# Patient Record
Sex: FEMALE | Race: BLACK OR AFRICAN AMERICAN | NOT HISPANIC OR LATINO | URBAN - METROPOLITAN AREA
[De-identification: names, ages, dates, MRNs, and addresses within clinical notes are randomized per-mention and may not be internally consistent; named-entity substitution may affect disease eponyms.]

---

## 2023-01-16 ENCOUNTER — EMERGENCY (EMERGENCY)
Facility: HOSPITAL | Age: 43
LOS: 1 days | Discharge: HOME | End: 2023-01-16
Attending: STUDENT IN AN ORGANIZED HEALTH CARE EDUCATION/TRAINING PROGRAM | Admitting: STUDENT IN AN ORGANIZED HEALTH CARE EDUCATION/TRAINING PROGRAM
Payer: COMMERCIAL

## 2023-01-16 VITALS
DIASTOLIC BLOOD PRESSURE: 85 MMHG | TEMPERATURE: 98 F | RESPIRATION RATE: 16 BRPM | OXYGEN SATURATION: 98 % | SYSTOLIC BLOOD PRESSURE: 137 MMHG | HEART RATE: 84 BPM

## 2023-01-16 VITALS
OXYGEN SATURATION: 98 % | DIASTOLIC BLOOD PRESSURE: 87 MMHG | TEMPERATURE: 98 F | WEIGHT: 199.96 LBS | HEART RATE: 88 BPM | RESPIRATION RATE: 16 BRPM | SYSTOLIC BLOOD PRESSURE: 141 MMHG

## 2023-01-16 PROCEDURE — 99283 EMERGENCY DEPT VISIT LOW MDM: CPT

## 2023-01-16 RX ORDER — IBUPROFEN 200 MG
600 TABLET ORAL ONCE
Refills: 0 | Status: COMPLETED | OUTPATIENT
Start: 2023-01-16 | End: 2023-01-16

## 2023-01-16 RX ADMIN — Medication 600 MILLIGRAM(S): at 13:52

## 2023-01-16 RX ADMIN — Medication 600 MILLIGRAM(S): at 13:31
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
## 2023-01-16 NOTE — ED PROVIDER NOTE - NS ED ATTENDING STATEMENT MOD
This was a shared visit with the TORIE. I reviewed and verified the documentation and independently performed the documented:

## 2023-01-16 NOTE — ED PROVIDER NOTE - CPE EDP NEURO NORM
This is a recent snapshot of the patient's [facility] medical record.  For current drug dose and complete information and questions, call [phone] or In Basket pool, [facility] home infusion ([id number])  CSN Number:  [id number]       normal...

## 2023-01-16 NOTE — ED PROVIDER NOTE - SKIN, MLM
Skin intact. No evidence of rash. + mild swelling to right anterior lateral neck. no abrasions or lacerations. no ecchymosis. cap refill < 2 sec

## 2023-01-16 NOTE — ED ADULT NURSE NOTE - FINAL NURSING ELECTRONIC SIGNATURE
Blood pressure 104/72, pulse 72, height 3' 10.5\" (1.181 m), weight 52 lb 2 oz (23.6 kg). Patient presents today with mother reporting he has had a rash for the past 4 months. Skin is peeling on his fingertips. He does not bite his nails per mother. 16-Jan-2023 13:54

## 2023-01-16 NOTE — ED PROVIDER NOTE - ENMT, MLM
Airway patent, Nasal mucosa clear. Mouth with normal mucosa. Throat has no vesicles, no oropharyngeal exudates and uvula is midline. + mild TTP right face/mandible. FROM of jaw.
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
## 2023-01-16 NOTE — ED PROVIDER NOTE - PATIENT PORTAL LINK FT
You can access the FollowMyHealth Patient Portal offered by [facility] by registering at the following website: [email or web address]. By joining [facility]’s FollowMyHealth portal, you will also be able to view your health information using other applications (apps) compatible with our system.

## 2023-01-16 NOTE — ED PROVIDER NOTE - MUSCULOSKELETAL, MLM
+ TTP right anterior lateral neck. no crepitus. UE strength 5/5 bilaterally. no midline cervical tenderness with FROM of neck. radial pulses equal and intact bilaterally.
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
## 2024-07-29 ENCOUNTER — OUTPATIENT (OUTPATIENT)
Dept: OUTPATIENT SERVICES | Facility: HOSPITAL | Age: 44
LOS: 1 days | End: 2024-07-29
Payer: COMMERCIAL

## 2024-07-29 VITALS
RESPIRATION RATE: 17 BRPM | DIASTOLIC BLOOD PRESSURE: 87 MMHG | OXYGEN SATURATION: 100 % | HEIGHT: 64 IN | WEIGHT: 216.05 LBS | SYSTOLIC BLOOD PRESSURE: 127 MMHG | HEART RATE: 86 BPM | TEMPERATURE: 97 F

## 2024-07-29 DIAGNOSIS — Z98.891 HISTORY OF UTERINE SCAR FROM PREVIOUS SURGERY: Chronic | ICD-10-CM

## 2024-07-29 DIAGNOSIS — N93.9 ABNORMAL UTERINE AND VAGINAL BLEEDING, UNSPECIFIED: ICD-10-CM

## 2024-07-29 DIAGNOSIS — N84.0 POLYP OF CORPUS UTERI: ICD-10-CM

## 2024-07-29 DIAGNOSIS — Z01.812 ENCOUNTER FOR PREPROCEDURAL LABORATORY EXAMINATION: ICD-10-CM

## 2024-07-29 DIAGNOSIS — Z01.818 ENCOUNTER FOR OTHER PREPROCEDURAL EXAMINATION: ICD-10-CM

## 2024-07-29 LAB
HCG SERPL-ACNC: <1 MIU/ML — SIGNIFICANT CHANGE UP
HCT VFR BLD CALC: 35.1 % — SIGNIFICANT CHANGE UP (ref 34.5–45)
HGB BLD-MCNC: 11.1 G/DL — LOW (ref 11.5–15.5)
MCHC RBC-ENTMCNC: 28.5 PG — SIGNIFICANT CHANGE UP (ref 27–34)
MCHC RBC-ENTMCNC: 31.6 GM/DL — LOW (ref 32–36)
MCV RBC AUTO: 90 FL — SIGNIFICANT CHANGE UP (ref 80–100)
NRBC # BLD: 0 /100 WBCS — SIGNIFICANT CHANGE UP (ref 0–0)
PLATELET # BLD AUTO: 244 K/UL — SIGNIFICANT CHANGE UP (ref 150–400)
RBC # BLD: 3.9 M/UL — SIGNIFICANT CHANGE UP (ref 3.8–5.2)
RBC # FLD: 12.9 % — SIGNIFICANT CHANGE UP (ref 10.3–14.5)
WBC # BLD: 4.68 K/UL — SIGNIFICANT CHANGE UP (ref 3.8–10.5)
WBC # FLD AUTO: 4.68 K/UL — SIGNIFICANT CHANGE UP (ref 3.8–10.5)

## 2024-07-29 PROCEDURE — G0463: CPT

## 2024-07-29 PROCEDURE — 86850 RBC ANTIBODY SCREEN: CPT

## 2024-07-29 PROCEDURE — 36415 COLL VENOUS BLD VENIPUNCTURE: CPT

## 2024-07-29 PROCEDURE — 86900 BLOOD TYPING SEROLOGIC ABO: CPT

## 2024-07-29 PROCEDURE — 84702 CHORIONIC GONADOTROPIN TEST: CPT

## 2024-07-29 PROCEDURE — 86901 BLOOD TYPING SEROLOGIC RH(D): CPT

## 2024-07-29 PROCEDURE — 85027 COMPLETE CBC AUTOMATED: CPT

## 2024-07-29 NOTE — H&P PST ADULT - NSICDXPROCEDURE_GEN_ALL_CORE_FT
PROCEDURES:  Hysteroscopic sampling of endometrium 29-Jul-2024 13:20:54 & Polypc w/wo D&C Jaye Pittman  Hysteroscopy with removal of leiomyomata 29-Jul-2024 13:21:15  Jaye Pittman

## 2024-07-29 NOTE — H&P PST ADULT - HISTORY OF PRESENT ILLNESS
44 y/o female with PMH of Iron Deficiency Anemia (on oral iron last 13 years) & SHx  x 2 for PST having Dilation and Curettage Hysteroscopy - Polypectomy with Myosure by Dr. Crystal on 2024.  She reports over the past year having pelvic discomfort during cycle and longer periods.  Was seen by provider, imaging revealed fibroid and possible uterine polyp was recommended for the procedure.

## 2024-07-29 NOTE — H&P PST ADULT - PROBLEM SELECTOR PLAN 3
Labs CBC, HCG and T&S  No Medical clearance necessary  Pre op instructions reviewed and given.   No meds to take am of surgery.   Instructed to hold and/or avoid other NSAIDs and OTC supplements. Tylenol is ok. Verbalized understanding

## 2024-07-29 NOTE — H&P PST ADULT - NSANTHOSAYNRD_GEN_A_CORE
No. EZEKIEL screening performed.  STOP BANG Legend: 0-2 = LOW Risk; 3-4 = INTERMEDIATE Risk; 5-8 = HIGH Risk

## 2024-07-29 NOTE — H&P PST ADULT - NSICDXFAMILYHX_GEN_ALL_CORE_FT
FAMILY HISTORY:  Mother  Still living? Unknown  FH: type 2 diabetes, Age at diagnosis: Age Unknown    Sibling  Still living? Unknown  FH: type 2 diabetes, Age at diagnosis: Age Unknown  FH: type 2 diabetes, Age at diagnosis: Age Unknown

## 2024-07-31 ENCOUNTER — OFFICE (OUTPATIENT)
Dept: URBAN - METROPOLITAN AREA CLINIC 6 | Facility: CLINIC | Age: 44
Setting detail: OPHTHALMOLOGY
End: 2024-07-31
Payer: COMMERCIAL

## 2024-07-31 DIAGNOSIS — H43.393: ICD-10-CM

## 2024-07-31 DIAGNOSIS — H52.7: ICD-10-CM

## 2024-07-31 PROCEDURE — 92004 COMPRE OPH EXAM NEW PT 1/>: CPT | Performed by: OPHTHALMOLOGY

## 2024-07-31 PROCEDURE — 92015 DETERMINE REFRACTIVE STATE: CPT | Performed by: OPHTHALMOLOGY

## 2024-07-31 ASSESSMENT — CONFRONTATIONAL VISUAL FIELD TEST (CVF)
OS_FINDINGS: FULL
OD_FINDINGS: FULL

## 2024-08-07 NOTE — ASU PATIENT PROFILE, ADULT - NS TRANSFER PATIENT BELONGINGS
September 27, 2020       Gael Ibrahim DO  [address] [facility] [address]  Via In Basket      Patient: Alice Modi   YOB: 1939   Date of Visit: 9/22/2020       Dear Dr. Ibrahim:    Thank you for referring Alice Modi to me for evaluation. Below are my notes for this visit with him.    If you have questions, please do not hesitate to call me. I look forward to following your patient along with you.      Sincerely,        Mariann Watters MD        CC: No Recipients  Mariann Watters MD  9/27/2020  3:58 PM  Sign when Signing Visit  Reason For Visit  ALICE MODI is an established patient returning for follow-up of polymyalgia rheumatica on prednisone 1 mg daily. Osteoarthritis Rt hip - overall doing well.   A chaperone is not applicable. He is unaccompanied.  Date of service: 8/29/19      Referred By:  Gael Ibrahim DO         History of Present Illness  80 year old male returning for follow-up of polymyalgia rheumatica on low dose prednisone 1 mg daily. His last visit with me was on Aug. 29,  2019. He had a flare of PMR with shoulder pains a few weeks ago.  He increased his prednisone from 1 mg daily to 5 mg daily with resolution of his shoulder pain.  He has now had resolution of his PMR symptoms and his prednisone was lowered back to 1 mg daily.  He has no symptoms of giant cell arteritis including fevers, night sweats, anorexia, jaw claudication, scalp tenderness, or visual loss.  He was recently diagnosed with a mild right rotator cuff tear.  He now c/o pain in his right forearm and wrist and tingling in his right hand.  He saw Dr. Nitin Gonzales in orthopedics at the [facility] and he is being sent for an EMG/NCV.    He now also has leg cramps at night.       He had mild chronic right hip pain which was felt to be due to osteoarthritis of the hip. There was no left hip pain.  There were B/L 2nd trigger fingers and he saw Dr. Rafael Cortez previously in orthopedics  who did not inject the tendons and he did not recommend surgery. He had a probable renal stone in March 2016 while in [place] which passed spontaneously. he had prostate cancer which was in remission and was followed by Dr. Raul Rolon in urology. He had a remote episode of shingles in the right flank area which started in Oct, 2014. The skin lesions were mildly painful and resolved with a course of Valtrex from his dermatologist with no recurrence. He c/o intermittent right shoulder pain which was thought to be rotator cuff tendonitis from raquet ball. The right shoulder was injected with steroids by Dr. Brito in orthopedics at the [facility] in May 2015. His shoulder pain is now improved. His lower back pain was now improved. He complained of mild pain at the base of both thumbs which was most likely due to osteoarthritis. There was no pain in his remaining peripheral joints and he denied joint swelling. There were no further episodes of dizziness. There were no symptoms suggestive of giant cell arteritis including headaches, visual loss, jaw claudication, facial pain, scalp tenderness, fevers, night sweats, or anorexia. He now has developed a cataract. He has had no recurrence of left-sided scalp pain which had been present in the past for several years. He was previously sent for bilateral temporal artery biopsy on July 17, 2010. This revealed no evidence of giant cell arteritis. He also had a diagnosis of multiple renal stones with 14 stones on the left and 6 stones on the right last year. There were no recent renal stones. He was told that he had uric acid stones and he was started on allopurinol by Dr. Rolon in urology. He underwent lithotripsy at that time. There was another lithotripsy several months before that.  He had a recent episode of microscopic hematuria and he had cystoscopy with Dr. Rolon which was normal.     Review of Systems     Const: no fever, not feeling tired, no anorexia, no recent weight  gain   Eyes: no red eyes, no dry eyes and no loss of vision.   ENT: no dry mouth and no oral ulcers.   CV: no chest pain and no lower extremity edema.   Resp: no cough and no shortness of breath.   GI: no abdominal pain, no acid reflux, no nausea, no vomiting   : no dysuria.   Musc: joint pain, but no joint stiffness, no joint swelling   Neuro: no paresthesia, no headache and no numbness.   Skin: no rash.   Ten or more systems reviewed and negative except as noted above or in the HPI.      Allergies  Codeine Derivatives       Current Outpatient Medications   Medication Sig Dispense Refill   • losartan (COZAAR) 100 MG tablet Take 1 tablet by mouth daily. 90 tablet 1   • predniSONE (DELTASONE) 1 MG tablet TAKE 1 TABLET BY MOUTH DAILY 90 tablet 0   • famotidine (PEPCID) 20 MG tablet Take 1 tablet by mouth 2 times daily. 180 tablet 2   • amLODIPine (NORVASC) 5 MG tablet Take 1 tablet by mouth daily. 90 tablet 1   • ranitidine (ZANTAC) 150 MG tablet Take 1 tablet by mouth daily. 90 tablet 1   • potassium citrate ER 10 MEQ (1080 MG) Tab CR Take 1 tablet by mouth 3 times daily (with meals). 90 tablet 3   • saccharomyces boulardii (FLORASTOR) 250 MG capsule TAKE 1 CAPSULE TWICE DAILY     • allopurinol (ZYLOPRIM) 300 MG tablet daily.      • albuterol 108 (90 Base) MCG/ACT inhaler Inhale 2 puffs into the lungs every 4 hours as needed for Shortness of Breath or Wheezing. 1 Inhaler 5   • omeprazole (PRILOSEC) 40 MG capsule      • GLUCOSAMINE-CHONDROITIN PO      • atorvastatin (LIPITOR) 10 MG tablet Take 10 mg by mouth daily.     • hydrALAZINE (APRESOLINE) 25 MG tablet 1 tab qd 90 tablet 1   • silver sulfADIAZINE (THERMAZENE) 1 % cream APPLY TO AFFECTED AREA TWICE DAILY AS DIRECTED       No current facility-administered medications for this visit.      Active Problems  Abnormal stress test (R94.39)  Anemia (D64.9)  Arthralgia of multiple sites (M25.50)  Cervical stenosis of spine (M48.02)  Chronic kidney disease (CKD) stage  G3a/A2, moderately decreased glomerular filtration  rate (GFR) between 45-59 mL/min/1.73 square meter and albuminuria creatinine ratio  between  mg/g (N18.3)  Encounter for monitoring allopurinol therapy (Z51.81,Z79.899)  Essential hypertension (I10)  Iron deficiency anemia due to chronic blood loss (D50.0)  Light headed (R42)  Long-term current use of steroids  Lower back pain (M54.5)  Nephrolithiasis (N20.0)  Osteoarthritis of hand (M19.049)  Osteoarthritis of right hip (M16.11)  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     Surgical History  History of Appendectomy  History of Hernia Rep Inguin Bilat 1 Direct, Indirect W/ Graft, Prosth   · Right 1999.  History of Inguinal Hernia Repair   · Left May 2009.  History of Tonsillectomy     Family History  Family history of congestive heart failure (Z82.49)  Family history of myocardial infarction (Z82.49)  Family history of congestive heart failure (Z82.49)     Social History  Alcohol Use (History)   · One serving per day.  Former smoker (Z87.891)   · Quit 33 years ago.  Marital History - Currently    · 3 grown children.     retired .   Lifestyle: does not use tobacco, denies alcohol use and denies drug use.      Review  Past medical history, problem list, family history, surgical history and social history reviewed.        Visit Vitals  BP (!) 151/79   Pulse (!) 54   Temp 98 °F (36.7 °C) (Temporal)   Wt 78.5 kg (173 lb)   BMI 24.82 kg/m²         Physical Exam  Constitutional: alert cooperative male in no acute distress and current vital signs  reviewed   Head and Face: Temporal artery pulses 2+ symmetrically with no tenderness or nodules.   Eyes: normal conjunctiva and no dry eyes. the sclerae were normal  ENT: wearing mask on his nose and mouth  Neck: supple, no mass and no cervical adenopathy. thyroid not enlarged.   Pulmonary: clear to auscultation, no rales/crackles and no wheezing  Cardiovascular: normal rate, no murmurs regular rhythm, normal S1 and normal S2. Normal peripheral pulses.  edema was not present in the lower extremities.   Abdomen: soft, nontender, normal bowel sounds and no abdominal mass. no hepatomegaly and no splenomegaly.   Musculoskeletal: Full range of movement in shoulders and hips. Early bone hypertrophy in the first carpometacarpal joints bilaterally. Heberden's node Rt 2nd DIP joint. Tenderness over right lateral epicondyle with full range of movement in elbow.  Remainder of the peripheral joints revealed no synovitis, effusions, tenderness, limitation of movement, deformities, or malalignment. No subcutaneous nodules.   Neurologic: normal DTRs. Deep tendon reflexes: 2+ right biceps and 2+ left biceps. 2+ right patella 2+ left patella normal gait. normal bilateral hand  and muscle strength and tone were normal.        Skin, Hair, Nails: no rash. 1 cm superficail skin ulcer left pretibial region.        Results/Data     Component      Latest Ref Rng & Units 5/21/2020   WBC      4.2 - 11.0 K/mcL 9.8   RBC      4.50 - 5.90 mil/mcL 4.57   HGB      13.0 - 17.0 g/dL 14.8   HCT      39.0 - 51.0 % 46.4   MCV      78.0 - 100.0 fl 101.5 (H)   MCH      26.0 - 34.0 pg 32.4   MCHC      32.0 - 36.5 g/dL 31.9 (L)   RDW-CV      11.0 - 15.0 % 13.5   PLT      140 - 450 K/mcL 237   NRBC      0 /100 WBC 0   DIFFERENTIAL TYPE       AUTOMATED DIFFERENTIAL   Neutrophil      % 74   LYMPH      % 12   MONO      % 10   EOSIN      % 2   BASO      % 1   Percent Immature Granuloctyes      % 1   Absolute Neutrophil      1.8 - 7.7 K/mcL 7.3   Absolute  Lymph      1.0 - 4.0 K/mcL 1.2   Absolute Mono      0.3 - 0.9 K/mcL 0.9   Absolute Eos      0.1 - 0.5 K/mcL 0.2   Absolute Baso      0.0 - 0.3 K/mcL 0.1   Absolute Immature Granulocytes      0 - 0.2 K/mcl 0.1   Fasting Status      hrs UNKNOWN   Sodium      135 - 145 mmol/L 142   Potassium      3.4 - 5.1 mmol/L 4.8   Chloride      98 - 107 mmol/L 111 (H)   CO2      21 - 32 mmol/L 25   ANION GAP      10 - 20 mmol/L 11   Glucose      65 - 99 mg/dL 95   BUN      6 - 20 mg/dL 34 (H)   Creatinine      0.67 - 1.17 mg/dL 1.23 (H)   GFR Estimate,        63   GFR Estimate, Non African American       55   BUN/CREATININE RATIO      7 - 25 28 (H)   CALCIUM      8.4 - 10.2 mg/dL 8.6   TOTAL BILIRUBIN      0.2 - 1.0 mg/dL 1.0   AST/SGOT      <38 Units/L 17   ALT/SGPT      <64 Units/L 30   ALK PHOSPHATASE      45 - 117 Units/L 85   TOTAL PROTEIN      6.4 - 8.2 g/dL 5.9 (L)   Albumin      3.6 - 5.1 g/dL 3.7   GLOBULIN      2.0 - 4.0 g/dL 2.2   A/G Ratio, Serum      1.0 - 2.4 1.7   FASTING STATUS      hrs UNKNOWN   CHOLESTEROL      <200 mg/dL 205 (H)   CALCULATED LDL      <130 mg/dL 84   HDL      >39 mg/dL 109   TRIGLYCERIDE      <150 mg/dL 58   CALCULATED NON HDL      mg/dL 96   CHOL/HDL      <4.5 1.9   TSH      0.350 - 5.000 mcUnits/mL 1.056   C-REACTIVE PROTEIN      <1.0 mg/dL <0.3   ESR      0 - 20 mm/hr 9            ASSESSMENT  Polymyalgia rheumatica (M35.3)  Essential hypertension (I10)  Long-term current use of steroids  Osteoarthritis of right hip (M16.11)  Nephrolithiasis (N20.0)  Chronic renal insufficiency (N18.9)  Right rotator cuff syndrome, right (M75.101)        PLAN       His PMR was well-controlled on low dose prednisone 1 mg daily. There was no evidence of giant ell arteritis. There was no       indication for methotrexate therapy.        He has chronic low grade right hip pain due osteoarthritis.     He was advised not to take Aleve or other NSAIDs as these can exacerbate his hypertension and renal  insufficiency.     He will follow-up Dr. Rolon regarding his renal stones. He was advised to continue allopurinol 300 mg per day for hyperuricemia.    He will be having am EMG/NCV study at the [facility] to assess for carpal tunnel syndrome.  He will waer carpal tunnel splints.    He will have physical therapy for his right rotator cuff syndrome.   He will contact Dr. Gonzales to arrange this at the [facility]    He will try to take tonic water in the evening for his nocturnal leg cramps.     I will see him for followup in March 2021.  I can see him in the interim if the need arises.           CC:  Gael Ibrahim DO           Raul Rolon MD.            Nitin Gonzales MD        TIME:  Total face to face time with patient 20 minutes with >50% of time for counseling and coordinating care.                     Cell Phone/PDA (specify)/Clothing

## 2024-08-07 NOTE — ASU PATIENT PROFILE, ADULT - FALL HARM RISK - UNIVERSAL INTERVENTIONS
Bed in lowest position, wheels locked, appropriate side rails in place/Call bell, personal items and telephone in reach/Instruct patient to call for assistance before getting out of bed or chair/Non-slip footwear when patient is out of bed/[place] to call system/Physically safe environment - no spills, clutter or unnecessary equipment/Purposeful Proactive Rounding/Room/bathroom lighting operational, light cord in reach

## 2024-08-08 ENCOUNTER — OUTPATIENT (OUTPATIENT)
Dept: OUTPATIENT SERVICES | Facility: HOSPITAL | Age: 44
LOS: 1 days | End: 2024-08-08
Payer: COMMERCIAL

## 2024-08-08 VITALS
HEART RATE: 100 BPM | RESPIRATION RATE: 12 BRPM | SYSTOLIC BLOOD PRESSURE: 128 MMHG | OXYGEN SATURATION: 99 % | DIASTOLIC BLOOD PRESSURE: 77 MMHG | TEMPERATURE: 99 F

## 2024-08-08 VITALS
SYSTOLIC BLOOD PRESSURE: 139 MMHG | HEART RATE: 87 BPM | OXYGEN SATURATION: 96 % | RESPIRATION RATE: 16 BRPM | DIASTOLIC BLOOD PRESSURE: 88 MMHG

## 2024-08-08 DIAGNOSIS — Z98.891 HISTORY OF UTERINE SCAR FROM PREVIOUS SURGERY: Chronic | ICD-10-CM

## 2024-08-08 DIAGNOSIS — N93.9 ABNORMAL UTERINE AND VAGINAL BLEEDING, UNSPECIFIED: ICD-10-CM

## 2024-08-08 DIAGNOSIS — N84.0 POLYP OF CORPUS UTERI: ICD-10-CM

## 2024-08-08 LAB
ABO RH CONFIRMATION: SIGNIFICANT CHANGE UP
HCG UR QL: NEGATIVE — SIGNIFICANT CHANGE UP

## 2024-08-08 PROCEDURE — 88305 TISSUE EXAM BY PATHOLOGIST: CPT | Mod: 26

## 2024-08-08 PROCEDURE — 81025 URINE PREGNANCY TEST: CPT

## 2024-08-08 PROCEDURE — C1782: CPT

## 2024-08-08 PROCEDURE — 58558 HYSTEROSCOPY BIOPSY: CPT

## 2024-08-08 PROCEDURE — 36415 COLL VENOUS BLD VENIPUNCTURE: CPT

## 2024-08-08 PROCEDURE — 88305 TISSUE EXAM BY PATHOLOGIST: CPT

## 2024-08-08 DEVICE — MYOSURE TISSUE REMOVAL DEVICE LITE: Type: IMPLANTABLE DEVICE | Status: FUNCTIONAL

## 2024-08-08 DEVICE — MYOSURE TISSUE REMOVAL DEVICE REACH: Type: IMPLANTABLE DEVICE | Status: FUNCTIONAL

## 2024-08-08 DEVICE — MYOSURE TISSUE REMOVAL FMS FOR FLUENT XL: Type: IMPLANTABLE DEVICE | Status: FUNCTIONAL

## 2024-08-08 RX ORDER — IBUPROFEN 200 MG
3 TABLET ORAL
Refills: 0 | DISCHARGE

## 2024-08-08 RX ORDER — METOCLOPRAMIDE HCL 5 MG/ML
5 VIAL (ML) INJECTION ONCE
Refills: 0 | Status: DISCONTINUED | OUTPATIENT
Start: 2024-08-08 | End: 2024-08-08

## 2024-08-08 RX ORDER — ACETAMINOPHEN 500 MG
2 TABLET ORAL
Qty: 0 | Refills: 0 | DISCHARGE

## 2024-08-08 RX ORDER — OXYCODONE HYDROCHLORIDE 30 MG/1
5 TABLET ORAL ONCE
Refills: 0 | Status: DISCONTINUED | OUTPATIENT
Start: 2024-08-08 | End: 2024-08-08

## 2024-08-08 RX ORDER — FERROUS SULFATE 325(65) MG
1 TABLET ORAL
Refills: 0 | DISCHARGE

## 2024-08-08 RX ORDER — DEXTROSE MONOHYDRATE, SODIUM CHLORIDE, SODIUM LACTATE, CALCIUM CHLORIDE, MAGNESIUM CHLORIDE 1.5; 538; 448; 18.4; 5.08 G/100ML; MG/100ML; MG/100ML; MG/100ML; MG/100ML
1000 SOLUTION INTRAPERITONEAL
Refills: 0 | Status: DISCONTINUED | OUTPATIENT
Start: 2024-08-08 | End: 2024-08-08

## 2024-08-08 RX ORDER — ACETAMINOPHEN 500 MG
975 TABLET ORAL ONCE
Refills: 0 | Status: COMPLETED | OUTPATIENT
Start: 2024-08-08 | End: 2024-08-08

## 2024-08-08 RX ORDER — HYDROMORPHONE HCL IN 0.9% NACL 0.2 MG/ML
0.5 PLASTIC BAG, INJECTION (ML) INTRAVENOUS
Refills: 0 | Status: DISCONTINUED | OUTPATIENT
Start: 2024-08-08 | End: 2024-08-08

## 2024-08-08 RX ADMIN — Medication 975 MILLIGRAM(S): at 07:59

## 2024-08-08 RX ADMIN — Medication 975 MILLIGRAM(S): at 07:57

## 2024-08-08 NOTE — ASU DISCHARGE PLAN (ADULT/PEDIATRIC) - CARE PROVIDER_API CALL
Donya Crystal  Obstetrics and Gynecology  [address] [place] [address]  Phone: [phone]  Fax: [phone]  Established Patient  Follow Up Time: 1-3 days

## 2024-08-08 NOTE — BRIEF OPERATIVE NOTE - OPERATION/FINDINGS
uterus anteverted, minimally enlarged, cervix is long, closed and anteverted  uterus sounding to 11 cm  no adnexal masses  vulva and vagina wnl  fundal area wnl both ostium seen  thickened endometrial area in the lower uterine segment posteriorly and anteriorly with polypoid features

## 2024-08-08 NOTE — ASU DISCHARGE PLAN (ADULT/PEDIATRIC) - CARE PROVIDER_API CALL
Donya Crystal  Obstetrics and Gynecology  [address] [place] [id number]  Phone: [phone]  Fax: [phone]  Follow Up Time:

## 2024-08-08 NOTE — BRIEF OPERATIVE NOTE - NSICDXBRIEFPROCEDURE_GEN_ALL_CORE_FT
PROCEDURES:  Hysteroscopy, with dilation and curettage of uterus and polypectomy or uterine myomectomy using [employer] tissue removal system 08-Aug-2024 08:59:30  Donya Crystal

## 2024-08-08 NOTE — BRIEF OPERATIVE NOTE - NSICDXBRIEFPOSTOP_GEN_ALL_CORE_FT
POST-OP DIAGNOSIS:  Menorrhagia with regular cycle 08-Aug-2024 09:00:37  Donya Crystal  Thickened endometrium 08-Aug-2024 09:01:05  Donya Crystal  Endometrial polyp 08-Aug-2024 09:01:14  Donya Crystal

## 2024-08-08 NOTE — ASU DISCHARGE PLAN (ADULT/PEDIATRIC) - NO TUB BATHS DURATION
Patient getting discharged with stable vital signs. Patient peripheral IV has been removed. Patient going home with daughter via car. All patient belongings with patient at the time of discharge. Discharge instructions have been provided to patient and daughter at bedside. All questions/concerns have been answered.    1 week

## 2024-08-08 NOTE — ASU DISCHARGE PLAN (ADULT/PEDIATRIC) - NS MD DC FALL RISK RISK
For information on Fall & Injury Prevention, visit: [email or web address] OR  [email or web address] OR  https://www.cdc.gov/steadi/patient.html Right inguinal hernia see HPI

## 2024-08-08 NOTE — BRIEF OPERATIVE NOTE - NSICDXBRIEFPREOP_GEN_ALL_CORE_FT
PRE-OP DIAGNOSIS:  Menorrhagia with regular cycle 08-Aug-2024 09:00:01  Donya Crystal  Endometrial polyp 08-Aug-2024 09:00:15  Donya Crystal

## 2024-08-12 LAB — SURGICAL PATHOLOGY STUDY: SIGNIFICANT CHANGE UP

## 2025-01-02 NOTE — ASU PATIENT PROFILE, ADULT - SURGERY TIME
Patient called back, confirmed that she is good to come tomorrow for her treatment.   
Patient states she has a cold and has chemo tomorrow. Wants to know if that's ok  
Patient states that she has had a runny nose for about a week accompanied by congestion. Patient denies any fever and states that her symptoms have been improving. After discussing with Dr. Roman, voicemail left for patient that she is good to come for her treatment tomorrow.   
08:00

## (undated) DEVICE — PLV-SCD MACHINE: Type: DURABLE MEDICAL EQUIPMENT

## (undated) DEVICE — DRAPE TOWEL BLUE 17" X 24"

## (undated) DEVICE — DRAPE MAYO STAND 23"

## (undated) DEVICE — GLV 6 PROTEXIS (WHITE)

## (undated) DEVICE — VENODYNE/SCD SLEEVE CALF MEDIUM

## (undated) DEVICE — GOWN XXL

## (undated) DEVICE — WARMING BLANKET UPPER ADULT

## (undated) DEVICE — PACK LITHOTOMY

## (undated) DEVICE — SOL IRR BAG NS 0.9% 3000ML

## (undated) DEVICE — FLUENT FMS PROCEDURE KIT

## (undated) DEVICE — MYOSURE SCOPE SEAL

## (undated) DEVICE — DRAPE LIGHT HANDLE COVER (GREEN)

## (undated) DEVICE — VENODYNE/SCD SLEEVE CALF LARGE